# Patient Record
Sex: FEMALE | Race: BLACK OR AFRICAN AMERICAN
[De-identification: names, ages, dates, MRNs, and addresses within clinical notes are randomized per-mention and may not be internally consistent; named-entity substitution may affect disease eponyms.]

---

## 2018-12-24 ENCOUNTER — HOSPITAL ENCOUNTER (EMERGENCY)
Dept: HOSPITAL 72 - EMR | Age: 73
Discharge: HOME | End: 2018-12-24
Payer: COMMERCIAL

## 2018-12-24 VITALS — DIASTOLIC BLOOD PRESSURE: 86 MMHG | SYSTOLIC BLOOD PRESSURE: 164 MMHG

## 2018-12-24 VITALS — WEIGHT: 123 LBS | BODY MASS INDEX: 21 KG/M2 | HEIGHT: 64 IN

## 2018-12-24 DIAGNOSIS — J18.9: Primary | ICD-10-CM

## 2018-12-24 PROCEDURE — 99283 EMERGENCY DEPT VISIT LOW MDM: CPT

## 2018-12-24 NOTE — EMERGENCY ROOM REPORT
History of Present Illness


General


Chief Complaint:  Upper Respiratory Illness


Source:  Patient





Present Illness


HPI


73-year-old female presents ED for evaluation of cough.  States that's been 

persistent for more than one week.  Productive with yellowish phlegm.  Denies 

fevers or chills.  Denies chest pain or shortness of breath.  States she's been 

traveling here from another state.  Did not receive flu vaccine this year.  

Patient has good energy and good appetite.  No other aggravating relieving 

factors.  Denies any other associated symptoms


Allergies:  


Coded Allergies:  


     No Known Allergies (Unverified , 12/24/18)





Patient History


Past Medical History:  none


Past Surgical History:  none


Pertinent Family History:  none


Social History:  Denies: smoking, alcohol use, drug use


Last Menstrual Period:  menopause


Pregnant Now:  No


Immunizations:  UTD


Reviewed Nursing Documentation:  PMH: Agreed; PSxH: Agreed





Nursing Documentation-PMH


Past Medical History:  No Stated History





Review of Systems


All Other Systems:  negative except mentioned in HPI





Physical Exam





Vital Signs








  Date Time  Temp Pulse Resp B/P (MAP) Pulse Ox O2 Delivery O2 Flow Rate FiO2


 


12/24/18 08:54 98.1 96 18 164/86 98 Room Air  








Sp02 EP Interpretation:  reviewed, normal


General Appearance:  no apparent distress, alert, GCS 15, non-toxic


Head:  normocephalic, atraumatic


Eyes:  bilateral eye normal inspection, bilateral eye PERRL


ENT:  hearing grossly normal, normal pharynx, no angioedema, normal voice


Neck:  full range of motion, supple/symm/no masses


Respiratory:  chest non-tender, lungs clear, normal breath sounds, speaking 

full sentences


Cardiovascular #1:  regular rate, rhythm, no edema


Cardiovascular #2:  2+ carotid (R), 2+ carotid (L), 2+ radial (R), 2+ radial (L)

, 2+ dorsalis pedis (R), 2+ dorsalis pedis (L)


Gastrointestinal:  normal bowel sounds, non tender, soft, non-distended, no 

guarding, no rebound


Rectal:  deferred


Genitourinary:  normal inspection, no CVA tenderness


Musculoskeletal:  back normal, gait/station normal, normal range of motion, non-

tender


Neurologic:  alert, oriented x3, responsive, motor strength/tone normal, 

sensory intact, speech normal


Psychiatric:  judgement/insight normal, memory normal, mood/affect normal, no 

suicidal/homicidal ideation


Reflexes:  3+ bicep (R), 3+ bicep (L), 3+ tricep (R), 3+ tricep (L), 3+ knee (R)

, 3+ knee (L)


Skin:  normal color, no rash, warm/dry, well hydrated


Lymphatic:  no adenopathy





Medical Decision Making


Diagnostic Impression:  


 Primary Impression:  


 Atypical pneumonia


ER Course


Hospital Course 


73-year-old female presents ED complaining of cough





Differential diagnoses include: URI, pharyngitis, otitis media, asthma 





Clinical course


Patient placed on stretcher.  After initial history, physical exam reveals a 

female in no acute distress.  Bilateral TM unremarkable.  No pharyngeal 

erythema.  No tonsillar exudates.  No lymphadenopathy.  lungs clear. abdomen 

soft. 





Presentation consistent with atypical pneumonia.  Given presentation, i will 

prescribe antibiotics





She states she has follow-up with her PMD when she gets back home





Diagnosis - atypical pneumonia





Stable and discharged home with Rx amoxicillin, promethazine/codeine.  

Instructed to followup with PMD.  Return to ED if symptoms recur or worsen





Last Vital Signs








  Date Time  Temp Pulse Resp B/P (MAP) Pulse Ox O2 Delivery O2 Flow Rate FiO2


 


12/24/18 09:31 98.1 83 18 164/86 98 Room Air  








Status:  improved


Disposition:  HOME, SELF-CARE


Condition:  Stable


Scripts


Codeine/Promethazine Hcl* (PROMETHAZINE-CODEINE SYRUP*) 118 Ml Syrup


5 ML ORAL Q6H PRN for For Cough, #118 ML 0 Refills


   Prov: Jone Beckett MD         12/24/18 


Amoxicillin* (AMOXIL*) 500 Mg Capsule


500 MG ORAL THREE TIMES A DAY, #21 CAP


   Prov: Jone Beckett MD         12/24/18


Patient Instructions:  Community-Acquired Pneumonia, Adult, Easy-to-Read











Jone Beckett MD Dec 24, 2018 10:57